# Patient Record
Sex: MALE | Race: WHITE | URBAN - METROPOLITAN AREA
[De-identification: names, ages, dates, MRNs, and addresses within clinical notes are randomized per-mention and may not be internally consistent; named-entity substitution may affect disease eponyms.]

---

## 2017-06-07 ENCOUNTER — OFFICE VISIT (OUTPATIENT)
Dept: FAMILY MEDICINE | Facility: CLINIC | Age: 27
End: 2017-06-07
Payer: COMMERCIAL

## 2017-06-07 VITALS
RESPIRATION RATE: 28 BRPM | SYSTOLIC BLOOD PRESSURE: 120 MMHG | HEART RATE: 120 BPM | TEMPERATURE: 100.8 F | HEIGHT: 72 IN | WEIGHT: 167.5 LBS | DIASTOLIC BLOOD PRESSURE: 82 MMHG | BODY MASS INDEX: 22.69 KG/M2 | OXYGEN SATURATION: 99 %

## 2017-06-07 DIAGNOSIS — F17.200 TOBACCO USE DISORDER: ICD-10-CM

## 2017-06-07 DIAGNOSIS — R11.2 NAUSEA AND VOMITING, INTRACTABILITY OF VOMITING NOT SPECIFIED, UNSPECIFIED VOMITING TYPE: Primary | ICD-10-CM

## 2017-06-07 DIAGNOSIS — R07.9 CHEST PAIN, UNSPECIFIED TYPE: ICD-10-CM

## 2017-06-07 DIAGNOSIS — J45.20 INTERMITTENT ASTHMA, UNCOMPLICATED: ICD-10-CM

## 2017-06-07 DIAGNOSIS — R10.13 ABDOMINAL PAIN, EPIGASTRIC: ICD-10-CM

## 2017-06-07 PROCEDURE — 99214 OFFICE O/P EST MOD 30 MIN: CPT | Performed by: PHYSICIAN ASSISTANT

## 2017-06-07 RX ORDER — ONDANSETRON 4 MG/1
4 TABLET, ORALLY DISINTEGRATING ORAL ONCE
Qty: 1 TABLET | Refills: 0 | Status: SHIPPED | OUTPATIENT
Start: 2017-06-07 | End: 2017-06-07

## 2017-06-07 ASSESSMENT — PAIN SCALES - GENERAL: PAINLEVEL: MILD PAIN (2)

## 2017-06-07 NOTE — NURSING NOTE
"Chief Complaint   Patient presents with     Vomiting       Initial /82  Pulse 100  Temp 100.8  F (38.2  C) (Temporal)  Resp 28  Ht 5' 11.65\" (1.82 m)  Wt 167 lb 8 oz (76 kg)  BMI 22.94 kg/m2 Estimated body mass index is 22.94 kg/(m^2) as calculated from the following:    Height as of this encounter: 5' 11.65\" (1.82 m).    Weight as of this encounter: 167 lb 8 oz (76 kg).  Medication Reconciliation: complete  "

## 2017-06-07 NOTE — MR AVS SNAPSHOT
"              After Visit Summary   6/7/2017    Bradley Alvarado    MRN: [id number]           Patient Information     Date Of Birth          1990        Visit Information        Provider Department      6/7/2017 11:00 AM Kathie Sifuentes PA-C [facility]        Today's Diagnoses     Nausea and vomiting, intractability of vomiting not specified, unspecified vomiting type    -  1    Abdominal pain, epigastric        Chest pain, unspecified type        Tobacco use disorder        Intermittent asthma, uncomplicated           Follow-ups after your visit        Who to contact     If you have questions or need follow up information about today's clinic visit or your schedule please contact [facility] directly at [phone].  Normal or non-critical lab and imaging results will be communicated to you by MyChart, letter or phone within 4 business days after the clinic has received the results. If you do not hear from us within 7 days, please contact the clinic through MyChart or phone. If you have a critical or abnormal lab result, we will notify you by phone as soon as possible.  Submit refill requests through [employer] or call your pharmacy and they will forward the refill request to us. Please allow 3 business days for your refill to be completed.          Additional Information About Your Visit        MyChart Information     [employer] lets you send messages to your doctor, view your test results, renew your prescriptions, schedule appointments and more. To sign up, go to [email or web address] . Click on \"Log in\" on the left side of the screen, which will take you to the Welcome page. Then click on \"Sign up Now\" on the right side of the page.     You will be asked to enter the access code listed below, as well as some personal information. Please follow the directions to create your username and password.     Your access code is: [email or web address]  Expires: 9/5/2017  1:21 PM     Your access code " "will  in 90 days. If you need help or a new code, please call your [facility] clinic or [phone].        Care EveryWhere ID     This is your Care EveryWhere ID. This could be used by other organizations to access your [facility] medical records  [id number]        Your Vitals Were     Pulse Temperature Respirations Height Pulse Oximetry BMI (Body Mass Index)    120 100.8  F (38.2  C) (Temporal) 28 5' 11.65\" (1.82 m) 99% 22.94 kg/m2       Blood Pressure from Last 3 Encounters:   17 120/82   14 124/76   13 142/80    Weight from Last 3 Encounters:   17 167 lb 8 oz (76 kg)   14 159 lb 14.4 oz (72.5 kg)   13 155 lb 3.2 oz (70.4 kg)              Today, you had the following     No orders found for display         Today's Medication Changes          These changes are accurate as of: 17  1:21 PM.  If you have any questions, ask your nurse or doctor.               Start taking these medicines.        Dose/Directions    ondansetron 4 MG ODT tab   Commonly known as:  ZOFRAN ODT   Used for:  Nausea and vomiting, intractability of vomiting not specified, unspecified vomiting type   Started by:  Kathie Sifuentes PA-C        Dose:  4 mg   Take 1 tablet (4 mg) by mouth once for 1 dose   Quantity:  1 tablet   Refills:  0            Where to get your medicines      Some of these will need a paper prescription and others can be bought over the counter.  Ask your nurse if you have questions.     Bring a paper prescription for each of these medications     ondansetron 4 MG ODT tab                Primary Care Provider Office Phone # Fax #    JANNETH Dow [facility] [phone]       [facility] [address] [facility] [address]        Thank you!     Thank you for choosing [facility]  for your care. Our goal is always to provide you with excellent care. Hearing back from our patients is one way we can continue to improve our services. Please take a " few minutes to complete the written survey that you may receive in the mail after your visit with us. Thank you!             Your Updated Medication List - Protect others around you: Learn how to safely use, store and throw away your medicines at www.disposemymeds.org.          This list is accurate as of: 6/7/17  1:21 PM.  Always use your most recent med list.                   Brand Name Dispense Instructions for use    albuterol 108 (90 BASE) MCG/ACT Inhaler    albuterol    1 Inhaler    Inhale 2 puffs into the lungs 4 times daily as needed for shortness of breath / dyspnea       ondansetron 4 MG ODT tab    ZOFRAN ODT    1 tablet    Take 1 tablet (4 mg) by mouth once for 1 dose

## 2017-06-07 NOTE — PROGRESS NOTES
SUBJECTIVE:                                                    Bradley Alvarado is a 26 year old male who presents to clinic today for the following health issues:    Nausea, Vomiting     Onset: Yesterday- ~23 hrs ago.    Made lunch yesterday of microwave rice and grilled chicken he bought last weekend on the grill (was frozen)- 1 piece was white and other piece a little pink area but still ate it. Started to feel sick 1 hour later (12:30pm - ~23 hrs ago) after eating it. Feeling cold and nauseated and burping. Some upper abdominal pain, aching. Started vomiting - 3 hrs after eating the chicken and rice meal.     No one else ate it with him.     Vomit looked like rice and chicken. No blood in emesis    Vomited # 3-4 times yesterday. Last emesis 10:00 pm last night.     Still feeling nauseated.     abd pain little bit better- rates 2/10    Feeling dizzy and lightheaded.    No heartburn. Took tums last night for nausea.     No diarrhea. Last BM- 2-3days. That is normal pattern for him    Urinating normally, this am. No hematuria, painful urination. No  sx.     Took ibuprofen last night 9pm. Nothing today.     No hx of abd surgery.    Description:   Noticed yesterday he was really cold. Vomited after getting to work yesterday, 3-4 times. No Diarrhea. Has a headache now, can't keep anything down. Gatorade has stayed down and is helping. Had chicken yesterday for lunch 11:30-12:00, had rice with it. Made it at home. No sweats and dose not feel feverish. Fatigued. Hasn't tried any foods today. Had 1 saltine cracker last night at 1am, has kept that down.     Intensity: mild    Progression of Symptoms:  improving and Feels better than yesterday, but is still cold.     Accompanying Signs & Symptoms:  Chills       Previous history of similar problem:   N/A    Precipitating factors:   Worsened by: N/A     Alleviating factors:  Improved by: Gatorade    When laying in bed last night felt short of breath- used albuterol 4x  "last night- helped some. Out of breath when walked to car to drive here- had to stop. Has asthma. Feels like asthma has been controlled. Coughing after this started. Has CP now- CP started yesterday prior to the vomiting. Maybe present a few days.      Therapies Tried and outcome: N/A    No ill exposures  No travel recently  Smoker- recently started smoking- 3 weeks ago.   Alcohol- 6 drinks - whiskey over weekend 3-4 d ago.   Denies drug use.  Nsaids- not often  Works - \" in refrigerator\".    Problem list and histories reviewed & adjusted, as indicated.  Additional history: as documented    Patient Active Problem List   Diagnosis     ADD (attention deficit disorder)     CARDIOVASCULAR SCREENING; LDL GOAL LESS THAN 160     Intermittent asthma     Past Surgical History:   Procedure Laterality Date     IMPLANT SHUNT VENTRICULOPERITONEAL  1993     REPAIR HYPOSPADIAS  2004       Social History   Substance Use Topics     Smoking status: Never Smoker     Smokeless tobacco: Never Used      Comment: lives with smokers     Alcohol use No     Family History   Problem Relation Age of Onset     Genitourinary Problems Brother      pancreas removd     Neurologic Disorder Brother      history of seizures, ADHD     Arthritis Mother      Neurologic Disorder Mother      epilipsy     HEART DISEASE Maternal Grandfather      heart surgery     DIABETES Maternal Grandfather      Hypertension Maternal Grandfather      HEART DISEASE Paternal Grandfather      surgery     Other Cancer Father      Asthma No family hx of      C.A.D. No family hx of      CEREBROVASCULAR DISEASE No family hx of      Breast Cancer No family hx of      Cancer - colorectal No family hx of      Prostate Cancer No family hx of      CANCER No family hx of      Blood Disease No family hx of      Cardiovascular No family hx of      Circulatory No family hx of      Endocrine Disease No family hx of      Eye Disorder No family hx of      GASTROINTESTINAL DISEASE No " "family hx of      Musculoskeletal Disorder No family hx of      Respiratory No family hx of      Thyroid Disease No family hx of      Alcohol/Drug No family hx of      Allergies No family hx of      Alzheimer Disease No family hx of      Anesthesia Reaction No family hx of      Congenital Anomalies No family hx of          Current Outpatient Prescriptions   Medication Sig Dispense Refill     ondansetron (ZOFRAN ODT) 4 MG ODT tab Take 1 tablet (4 mg) by mouth once for 1 dose 1 tablet 0     albuterol (ALBUTEROL) 108 (90 BASE) MCG/ACT inhaler Inhale 2 puffs into the lungs 4 times daily as needed for shortness of breath / dyspnea 1 Inhaler 2     No Known Allergies  BP Readings from Last 3 Encounters:   06/07/17 120/82   02/03/14 124/76   12/23/13 142/80    Wt Readings from Last 3 Encounters:   06/07/17 167 lb 8 oz (76 kg)   02/03/14 159 lb 14.4 oz (72.5 kg)   12/23/13 155 lb 3.2 oz (70.4 kg)              Labs reviewed in EPIC    Reviewed and updated as needed this visit by clinical staff  Allergies  Meds  Problems       Reviewed and updated as needed this visit by Provider  Allergies  Meds  Problems         ROS:  As above.     OBJECTIVE:                                                    /82  Pulse 120  Temp 100.8  F (38.2  C) (Temporal)  Resp 28  Ht 5' 11.65\" (1.82 m)  Wt 167 lb 8 oz (76 kg)  SpO2 99%  BMI 22.94 kg/m2  Body mass index is 22.94 kg/(m^2).  GENERAL: uncomfortable, 27yo male- actively vomiting, burping, alert, mildly diaphoretic  EYES: Eyes grossly normal to inspection, PERRL and conjunctivae and sclerae normal  HENT: ear canals and TM's normal, nose and mouth without ulcers or lesions  NECK: no adenopathy, no asymmetry  RESP: lungs clear to auscultation - no rales, rhonchi or wheezes; mildly increased RR  CV: tachycardia, 120 bpm, normal S1 S2, no S3 or S4 and no murmur, click or rub. No pain with palpation or pressure over precordium  ABDOMEN: neg CVA TTP; bowel sounds normal, " epigastric tenderness; +LUQ tenderness w/guarding. No rebound.   SKIN: no suspicious lesions or rashes  NEURO: Normal strength and tone, mentation intact and speech normal    Diagnostic Test Results:  Clinical course: Patient with emesis x2 while in clinic. Given zofran 4 mg ODT in clinic.      ASSESSMENT/PLAN:                                                      1. Nausea and vomiting, intractability of vomiting not specified, unspecified vomiting type  Patient actively vomiting, dizzy, feeling SOB with LUQ vs chest pain.   Attempted to get a hold of pts family, unable. Called ambulance transport to ER.   - ondansetron (ZOFRAN ODT) 4 MG ODT tab; Take 1 tablet (4 mg) by mouth once for 1 dose  Dispense: 1 tablet; Refill: 0    2. Abdominal pain, epigastric & LUQ    3. Chest pain, unspecified type  Unclear if CP and SOB is related to vomiting and illness or other.     4. Tobacco use disorder  Encouraged cessation    5. Intermittent asthma, uncomplicated      Follow Up: For worsening symptoms (ie new fevers, worsening pain, etc), non-improvement as expected/discussed, questions regarding your medications or treatment plan. Discussed parameters for follow up and included in After Visit Summary given to patient.      Kathie Sifuentes PA-C  [facility]

## 2017-06-07 NOTE — NURSING NOTE
The following medication was given:     MEDICATION:  Oral Ondansetron  ROUTE: PO  SITE: mouth  DOSE: 4mg   LOT #: [id number]  : [employer]  EXPIRATION DATE: 09/2018  NDC#: 65862-390-10   Was there drug waste? No      Devika Tomlin CMA  June 7, 2017

## 2017-07-25 ENCOUNTER — TELEPHONE (OUTPATIENT)
Dept: FAMILY MEDICINE | Facility: CLINIC | Age: 27
End: 2017-07-25

## 2017-07-25 NOTE — TELEPHONE ENCOUNTER
Summary:    Patient is due/failing the following:   ACT    Action needed:   Patient needs to do ACT.    Type of outreach:    Sent letter.    Questions for provider review:    None                                                                                                                                    Rose Mary Thaddeusnick       Chart routed to Care Team .      Panel Management Review      Patient has the following on his problem list:     Asthma review     ACT Total Scores 2/3/2014   ACT TOTAL SCORE 25   ASTHMA ER VISITS 0 = None   ASTHMA HOSPITALIZATIONS 0 = None      1. Is Asthma diagnosis on the Problem List? Yes    2. Is Asthma listed on Health Maintenance? Yes    3. Patient is due for:  ACT        Composite cancer screening  Chart review shows that this patient is due/due soon for the following None

## 2017-07-25 NOTE — LETTER
[facility]  [address], [address]  [place] [address]  Phone: [phone]  Fax: [phone]  July 25, 2017      Bradley lAvarado  [address] [facility] [place] [address]      Dear Bradley,    We care about your health and have reviewed your health plan including your medical conditions, medications, and lab results.  Based on this review, it is recommended that you follow up regarding the following health topic(s):  -Asthma    We recommend you take the following action(s):   -Complete and return the attached ASTHMA CONTROL TEST.  If your total score is 19 or less or you have been to the ER or urgent care for your asthma, then please schedule an asthma followup appointment.     Please call us at the [facility] - [phone] (or use [employer]) to address the above recommendations.     Thank you for trusting [facility] and we appreciate the opportunity to serve you.  We look forward to supporting your healthcare needs in the future.    Healthy Regards,    Your Health Care Team  [facility]

## 2017-10-19 ENCOUNTER — TELEPHONE (OUTPATIENT)
Dept: FAMILY MEDICINE | Facility: CLINIC | Age: 27
End: 2017-10-19

## 2017-10-19 NOTE — LETTER
[facility]  [address]  [place] [address]  Phone: [phone]  Fax: [phone]  October 19, 2017      Bradley Alvarado  [address] [facility] [place] [address]      Dear Bradley,    We care about your health and have reviewed your health plan including your medical conditions, medications, and lab results.  Based on this review, it is recommended that you follow up regarding the following health topic(s):  -Asthma    We recommend you take the following action(s):   -Complete and return the attached ASTHMA CONTROL TEST.  If your total score is 19 or less or you have been to the ER or urgent care for your asthma, then please schedule an asthma followup appointment.     Please call us at the [facility] - [phone] (or use [employer]) to address the above recommendations.     Thank you for trusting [facility] and we appreciate the opportunity to serve you.  We look forward to supporting your healthcare needs in the future.    Healthy Regards,    Your Health Care Team  [facility]

## 2017-12-18 ENCOUNTER — TELEPHONE (OUTPATIENT)
Dept: FAMILY MEDICINE | Facility: CLINIC | Age: 27
End: 2017-12-18

## 2017-12-18 NOTE — TELEPHONE ENCOUNTER
Panel Management Review      Patient has the following on his problem list:     Asthma review     ACT Total Scores 2/3/2014   ACT TOTAL SCORE 25   ASTHMA ER VISITS 0 = None   ASTHMA HOSPITALIZATIONS 0 = None      1. Is Asthma diagnosis on the Problem List? Yes    2. Is Asthma listed on Health Maintenance? Yes    3. Patient is due for:  ACT        Composite cancer screening  Chart review shows that this patient is due/due soon for the following None  Summary:    Patient is due/failing the following:   ACT    Action needed:   Patient needs to do ACT.    Type of outreach:    Sent letter.    Questions for provider review:    None                                                                                                                                    Rose Mary Killian     Chart routed to Care Team .

## 2017-12-18 NOTE — LETTER
[facility]  [address], [address]  [place] [address]  Phone: [phone]  Fax: [phone]  December 18, 2017      Bradley Alvarado  [address] [facility] [place] [address]      Dear Bradley,    We care about your health and have reviewed your health plan including your medical conditions, medications, and lab results.  Based on this review, it is recommended that you follow up regarding the following health topic(s):  -Asthma    We recommend you take the following action(s):   -Complete and return the attached ASTHMA CONTROL TEST.  If your total score is 19 or less or you have been to the ER or urgent care for your asthma, then please schedule an asthma followup appointment.     Please call us at the [facility] - [phone] (or use [employer]) to address the above recommendations.     Thank you for trusting [facility] and we appreciate the opportunity to serve you.  We look forward to supporting your healthcare needs in the future.    Healthy Regards,    Your Health Care Team  [facility]

## 2018-06-06 ENCOUNTER — TELEPHONE (OUTPATIENT)
Dept: FAMILY MEDICINE | Facility: CLINIC | Age: 28
End: 2018-06-06

## 2018-06-06 NOTE — LETTER
[facility]  [address], [address]  [place] [address]  Phone: [phone]  Fax: [phone]  June 6, 2018      Bradley Alvarado  [address] [facility] [place] [address]      Dear Bradley,    We care about your health and have reviewed your health plan including your medical conditions, medications, and lab results.  Based on this review, it is recommended that you follow up regarding the following health topic(s):  -Asthma    We recommend you take the following action(s):   -Complete and return the attached ASTHMA CONTROL TEST.  If your total score is 19 or less or you have been to the ER or urgent care for your asthma, then please schedule an asthma followup appointment.     Please call us at the [facility] - [phone] (or use [employer]) to address the above recommendations.     Thank you for trusting [facility] and we appreciate the opportunity to serve you.  We look forward to supporting your healthcare needs in the future.    Healthy Regards,    Your Health Care Team  [facility]